# Patient Record
Sex: FEMALE | Race: WHITE | NOT HISPANIC OR LATINO | URBAN - METROPOLITAN AREA
[De-identification: names, ages, dates, MRNs, and addresses within clinical notes are randomized per-mention and may not be internally consistent; named-entity substitution may affect disease eponyms.]

---

## 2024-02-20 ENCOUNTER — OFFICE VISIT (OUTPATIENT)
Dept: UROLOGY | Facility: CLINIC | Age: 69
End: 2024-02-20
Payer: MEDICARE

## 2024-02-20 VITALS
WEIGHT: 136 LBS | BODY MASS INDEX: 25.03 KG/M2 | DIASTOLIC BLOOD PRESSURE: 73 MMHG | HEIGHT: 62 IN | HEART RATE: 64 BPM | SYSTOLIC BLOOD PRESSURE: 119 MMHG

## 2024-02-20 DIAGNOSIS — N20.0 NEPHROLITHIASIS: ICD-10-CM

## 2024-02-20 DIAGNOSIS — N39.0 RECURRENT UTI: Primary | ICD-10-CM

## 2024-02-20 PROCEDURE — 99999 PR PBB SHADOW E&M-NEW PATIENT-LVL IV: CPT | Mod: PBBFAC,,, | Performed by: UROLOGY

## 2024-02-20 PROCEDURE — 99204 OFFICE O/P NEW MOD 45 MIN: CPT | Mod: S$PBB,,, | Performed by: UROLOGY

## 2024-02-20 PROCEDURE — 99204 OFFICE O/P NEW MOD 45 MIN: CPT | Mod: PBBFAC | Performed by: UROLOGY

## 2024-02-20 RX ORDER — HYDROCHLOROTHIAZIDE 12.5 MG/1
12.5 TABLET ORAL
COMMUNITY
Start: 2024-02-08

## 2024-02-20 RX ORDER — MULTIVITAMIN
1 TABLET ORAL DAILY
COMMUNITY

## 2024-02-20 RX ORDER — TROSPIUM CHLORIDE 20 MG/1
20 TABLET, FILM COATED ORAL 2 TIMES DAILY
Qty: 60 TABLET | Refills: 11 | Status: SHIPPED | OUTPATIENT
Start: 2024-02-20 | End: 2024-02-20 | Stop reason: SDUPTHER

## 2024-02-20 RX ORDER — ESCITALOPRAM OXALATE 5 MG/1
5 TABLET ORAL
COMMUNITY
Start: 2024-01-29

## 2024-02-20 RX ORDER — OMEPRAZOLE 40 MG/1
40 CAPSULE, DELAYED RELEASE ORAL
COMMUNITY
Start: 2023-12-16

## 2024-02-20 RX ORDER — ESTRADIOL 10 UG/1
TABLET VAGINAL
COMMUNITY
Start: 2024-01-29

## 2024-02-20 RX ORDER — TROSPIUM CHLORIDE 20 MG/1
20 TABLET, FILM COATED ORAL 2 TIMES DAILY
Qty: 60 TABLET | Refills: 11 | Status: SHIPPED | OUTPATIENT
Start: 2024-02-20

## 2024-02-20 RX ORDER — ASPIRIN 81 MG/1
81 TABLET ORAL DAILY
COMMUNITY

## 2024-02-20 RX ORDER — ATORVASTATIN CALCIUM 40 MG/1
40 TABLET, FILM COATED ORAL
COMMUNITY
Start: 2024-02-19

## 2024-02-20 RX ORDER — CIPROFLOXACIN 500 MG/1
500 TABLET ORAL 2 TIMES DAILY
Qty: 14 TABLET | Refills: 0 | Status: SHIPPED | OUTPATIENT
Start: 2024-02-20 | End: 2024-02-27

## 2024-02-20 RX ORDER — NITROFURANTOIN MACROCRYSTALS 50 MG/1
50 CAPSULE ORAL DAILY PRN
Qty: 30 CAPSULE | Refills: 1 | Status: SHIPPED | OUTPATIENT
Start: 2024-02-20

## 2024-02-20 RX ORDER — VIBEGRON 75 MG/1
TABLET, FILM COATED ORAL DAILY
COMMUNITY

## 2024-02-20 NOTE — PROGRESS NOTES
"Urology - [facility]  Clinic Note    SUBJECTIVE:     Chief Complaint: recurrent uti    History of Present Illness:  Paty Saxena is a 68 y.o. female who presents to clinic for recurrent UTI. She is new to our clinic referred by I would like to thank So Saxena DO for referral of this patient. to our clinic. Referral from So Saxena DO   H/o recurrent UTI since 20s. She wants a second opinion. Gets UTI when she travels.   On vaginal estrogen, d-mannose  Was on prophylaxis. Most recent doctor stopped them.   She was on myrbetriq for urge incontinence, now on gemtesa. Never tried anticholinergic.   H/o back surgery 20 years ago,.    On HCTZ for prevention of kidney stones, had one 24 hour urine showed hypercalciuria.   Having some lower BP.   H/o kidney stones on imaging. Asymptomatic. 3mm. No recurrent stones.     1/11/2024 e.coli and enterococcus - pan sens. E.coli resist to amp    12/2022 klebsilella 10 - 100,000 rest to amp and macrobid    Ct urogram 2022 - left 3mm stone  Cysto 2022 negative      Anticoagulation:  No    OBJECTIVE:     Estimated body mass index is 24.88 kg/m² as calculated from the following:    Height as of this encounter: 5' 2" (1.575 m).    Weight as of this encounter: 61.7 kg (136 lb 0.4 oz).    Vital Signs (Most Recent)  Pulse: 64 (02/20/24 1035)  BP: 119/73 (02/20/24 1035)    Physical Exam  Vitals reviewed.   Pulmonary:      Effort: Pulmonary effort is normal.       No acute distress    Imaging:    No local records. All outside reports.       ASSESSMENT     1. Recurrent UTI    2. Nephrolithiasis      PLAN:   Diagnoses and all orders for this visit:    Recurrent UTI  -     Urine culture    Nephrolithiasis  -     US Kidney; Future  -     X-Ray Abdomen AP 1 View; Future    Other orders  -     Cancel: Urine culture  -     Cancel: Cytology, Urine  -     Cancel: CT Urogram Abd Pelvis W WO; Future  -     Cancel: Cystoscopy; Future  -     Cancel: Creatinine, serum; " Future  -     ciprofloxacin HCl (CIPRO) 500 MG tablet; Take 1 tablet (500 mg total) by mouth 2 (two) times daily. for 7 days  -     Discontinue: trospium (SANCTURA) 20 mg Tab tablet; Take 1 tablet (20 mg total) by mouth 2 (two) times daily.  -     trospium (SANCTURA) 20 mg Tab tablet; Take 1 tablet (20 mg total) by mouth 2 (two) times daily.  -     nitrofurantoin (MACRODANTIN) 50 MG capsule; Take 1 capsule (50 mg total) by mouth daily as needed. Prn sexual activity    D- mannose 1 gram twice a day (was only on 400 mg bid)  Continue vaginal estrogen.   Stop hctz - its a diurectic and likely contributing to her oab. Also only one stone 3mm.   Trial of trospium 20mg po bid - stop gemtesa.   Cipro for trip based on previous sensitivities.   Post coital prophylaxis.   F/u 3 months.       Hazel Vaughn MD     Letter to So Saxena DO

## 2024-02-20 NOTE — PATIENT INSTRUCTIONS
D mannose 2 grams a day - whole, foods, amaz[employer], [employer]  Continue vaginal estrogen.   More fluids  Stop hctz  Stop gemtesa  Post coital prophylaxis    Trial of trospium 20mg twice a day

## 2024-06-25 ENCOUNTER — OFFICE VISIT (OUTPATIENT)
Dept: UROLOGY | Facility: CLINIC | Age: 69
End: 2024-06-25
Payer: MEDICARE

## 2024-06-25 VITALS
DIASTOLIC BLOOD PRESSURE: 71 MMHG | BODY MASS INDEX: 24.56 KG/M2 | HEART RATE: 68 BPM | SYSTOLIC BLOOD PRESSURE: 134 MMHG | WEIGHT: 134.25 LBS

## 2024-06-25 DIAGNOSIS — N32.81 OVERACTIVE BLADDER: Primary | ICD-10-CM

## 2024-06-25 DIAGNOSIS — N39.0 RECURRENT UTI: ICD-10-CM

## 2024-06-25 LAB
BACTERIA #/AREA URNS AUTO: ABNORMAL /HPF
MICROSCOPIC COMMENT: ABNORMAL
SQUAMOUS #/AREA URNS AUTO: 1 /HPF
WBC #/AREA URNS AUTO: 1 /HPF (ref 0–5)

## 2024-06-25 PROCEDURE — 99999 PR PBB SHADOW E&M-EST. PATIENT-LVL III: CPT | Mod: PBBFAC,,, | Performed by: UROLOGY

## 2024-06-25 PROCEDURE — 81001 URINALYSIS AUTO W/SCOPE: CPT | Performed by: UROLOGY

## 2024-06-25 PROCEDURE — 87086 URINE CULTURE/COLONY COUNT: CPT | Performed by: UROLOGY

## 2024-06-25 PROCEDURE — 87077 CULTURE AEROBIC IDENTIFY: CPT | Performed by: UROLOGY

## 2024-06-25 PROCEDURE — 87186 SC STD MICRODIL/AGAR DIL: CPT | Performed by: UROLOGY

## 2024-06-25 PROCEDURE — 99214 OFFICE O/P EST MOD 30 MIN: CPT | Mod: S$PBB,,, | Performed by: UROLOGY

## 2024-06-25 PROCEDURE — 87088 URINE BACTERIA CULTURE: CPT | Performed by: UROLOGY

## 2024-06-25 PROCEDURE — 99213 OFFICE O/P EST LOW 20 MIN: CPT | Mod: PBBFAC | Performed by: UROLOGY

## 2024-06-25 RX ORDER — DIAZEPAM 5 MG/1
TABLET ORAL
Qty: 1 TABLET | Refills: 0 | Status: SHIPPED | OUTPATIENT
Start: 2024-06-25

## 2024-06-25 NOTE — PROGRESS NOTES
"Urology - [facility]  Clinic Note    SUBJECTIVE:     Chief Complaint: recurrent uti    History of Present Illness:  Paty Saxena is a 69 y.o. female who presents to clinic for recurrent UTI. She is new to our clinic referred by I would like to thank No ref. provider found for referral of this patient. to our clinic. Referral from No ref. provider found   H/o recurrent UTI since 20s. She wants a second opinion. Gets UTI when she travels.     On HCTZ for prevention of kidney stones, had one 24 hour urine showed hypercalciuria.   H/o kidney stones on imaging. Asymptomatic. 3mm. No recurrent stones.     1/11/2024 e.coli and enterococcus - pan sens. E.coli resist to amp    12/2022 klebsilella 10 - 100,000 rest to amp and macrobid    Ct urogram 2022 - left 3mm stone  Cysto 2022 negative    Reports foul smelling urine today- this bothers her.  No dysuria, frequency, urgency, hematuria.  On D-mannose and vaginal estrogen.  She was on myrbetriq for urge incontinence, now on trospium.   H/o back surgery 20 years ago.  No flank pain.  Reports a possible UTI in March 2024 before a trip.  No urine culture.  Treated by her son with abx.  Does not feel like she has kidney stones now but doesn't remember passing any.  Paranoid about bladder infections in the past.        Anticoagulation:  No    OBJECTIVE:     Estimated body mass index is 24.56 kg/m² as calculated from the following:    Height as of 2/20/24: 5' 2" (1.575 m).    Weight as of this encounter: 60.9 kg (134 lb 4.2 oz).    Vital Signs (Most Recent)  Pulse: 68 (06/25/24 0910)  BP: 134/71 (06/25/24 0910)    Physical Exam  Vitals reviewed.   Pulmonary:      Effort: Pulmonary effort is normal.       No acute distress    Urine dip tr leuks, tr blood     Imaging:    No local records. All outside reports.       ASSESSMENT     1. Overactive bladder    2. Recurrent UTI        PLAN:   Paty was seen today for follow-up.    Diagnoses and all orders for this " visit:    Overactive bladder    Recurrent UTI        D- mannose 1 gram twice a day (was only on 400 mg bid)  Continue vaginal estrogen.   Continue trospium 20mg po bid     Never completed recurrent UTI workup.  Will plan to schedule cysto, renal US today.  Also KUB because of stone history.    Urine sent for culture and micro         Niki Malone MD     Letter to No ref. provider found

## 2024-06-27 ENCOUNTER — HOSPITAL ENCOUNTER (OUTPATIENT)
Dept: RADIOLOGY | Facility: HOSPITAL | Age: 69
Discharge: HOME | End: 2024-06-27
Attending: UROLOGY
Payer: MEDICARE

## 2024-06-27 DIAGNOSIS — N20.0 NEPHROLITHIASIS: ICD-10-CM

## 2024-06-27 LAB — BACTERIA UR CULT: ABNORMAL

## 2024-06-27 PROCEDURE — 76770 US EXAM ABDO BACK WALL COMP: CPT | Mod: TC

## 2024-06-27 PROCEDURE — 74018 RADEX ABDOMEN 1 VIEW: CPT | Mod: 26,,, | Performed by: RADIOLOGY

## 2024-06-27 PROCEDURE — 74018 RADEX ABDOMEN 1 VIEW: CPT | Mod: TC

## 2024-06-27 PROCEDURE — 76770 US EXAM ABDO BACK WALL COMP: CPT | Mod: 26,,, | Performed by: RADIOLOGY

## 2024-07-03 ENCOUNTER — PROCEDURE VISIT (OUTPATIENT)
Dept: UROLOGY | Facility: CLINIC | Age: 69
End: 2024-07-03
Payer: MEDICARE

## 2024-07-03 VITALS
WEIGHT: 125 LBS | SYSTOLIC BLOOD PRESSURE: 143 MMHG | BODY MASS INDEX: 22.86 KG/M2 | DIASTOLIC BLOOD PRESSURE: 67 MMHG | TEMPERATURE: 98 F | HEART RATE: 62 BPM | RESPIRATION RATE: 20 BRPM

## 2024-07-03 DIAGNOSIS — N32.81 OVERACTIVE BLADDER: ICD-10-CM

## 2024-07-03 DIAGNOSIS — N39.0 RECURRENT UTI: ICD-10-CM

## 2024-07-03 PROCEDURE — 96372 THER/PROPH/DIAG INJ SC/IM: CPT | Mod: PBBFAC

## 2024-07-03 PROCEDURE — 52000 CYSTOURETHROSCOPY: CPT | Mod: PBBFAC | Performed by: UROLOGY

## 2024-07-03 PROCEDURE — 99999PBSHW PR PBB SHADOW TECHNICAL ONLY FILED TO HB: Mod: PBBFAC,,,

## 2024-07-03 RX ORDER — GENTAMICIN 40 MG/ML
80 INJECTION, SOLUTION INTRAMUSCULAR; INTRAVENOUS
Status: COMPLETED | OUTPATIENT
Start: 2024-07-03 | End: 2024-07-03

## 2024-07-03 RX ORDER — CIPROFLOXACIN 500 MG/1
500 TABLET ORAL 2 TIMES DAILY
Qty: 10 TABLET | Refills: 0 | Status: SHIPPED | OUTPATIENT
Start: 2024-07-03 | End: 2024-07-08

## 2024-07-03 RX ORDER — GENTAMICIN 40 MG/ML
80 INJECTION, SOLUTION INTRAMUSCULAR; INTRAVENOUS
Status: SHIPPED | OUTPATIENT
Start: 2024-07-03

## 2024-07-03 RX ORDER — LIDOCAINE HYDROCHLORIDE 20 MG/ML
JELLY TOPICAL
Status: COMPLETED | OUTPATIENT
Start: 2024-07-03 | End: 2024-07-03

## 2024-07-03 RX ADMIN — GENTAMICIN SULFATE 80 MG: 40 INJECTION, SOLUTION INTRAMUSCULAR; INTRAVENOUS at 08:07

## 2024-07-03 RX ADMIN — LIDOCAINE HYDROCHLORIDE: 20 JELLY TOPICAL at 08:07

## 2024-07-03 NOTE — PATIENT INSTRUCTIONS
What to Expect After a Cystoscopy  For the next 24-48 hours, you may feel a mild burning when you urinate. This burning is normal and expected. Drink plenty of water to dilute the urine to help relieve the burning sensation. You may also see a small amount of blood in your urine after the procedure.    Unless you are already taking antibiotics, you may be given an antibiotic after the test to prevent infection.    Signs and Symptoms to Report  Call the [facility] at [phone] if you develop any of the following:  Fever of 101 degrees or higher  Chills or persistent bleeding  Inability to urinate

## 2024-07-03 NOTE — PROCEDURES
Cystoscopy    Date/Time: 7/3/2024 8:15 AM    Performed by: Hazel Vaughn MD  Authorized by: Hazel Vaughn MD    Consent Done?:  Yes (Written)  Timeout: prior to procedure the correct patient, procedure, and site was verified    Prep: patient was prepped and draped in usual sterile fashion    Local anesthesia used?: Yes    Local anesthetic:  Topical anesthetic  Preparation: Patient was prepped and draped in usual sterile fashion    Scope type:  Flexible cystoscope  External exam normal: Yes    Urethra normal: Yes    Bladder neck normal: Yes    Bladder normal: Yes     patient tolerated the procedure well with no immediate complications  Comments:      Evidence of cystitis cystica.   Not treated on last culture. Has script for cipro already. She will take it. Gent 80mg IM given today. She is really asymptomatic. Urine was clear.   Continue vaginal estrogen, d-mannose, probiotics. Post coital prophylaxis.   F/u 3 months.   Has a 1.5mm stone on ultrasound. Likely not real. Plan for kub in 1 year.   F/u 3 months.

## 2024-07-05 DIAGNOSIS — N20.0 NEPHROLITHIASIS: Primary | ICD-10-CM

## 2024-07-26 ENCOUNTER — CLINICAL SUPPORT (OUTPATIENT)
Dept: REHABILITATION | Facility: HOSPITAL | Age: 69
End: 2024-07-26
Attending: UROLOGY
Payer: MEDICARE

## 2024-07-26 DIAGNOSIS — R27.8 COORDINATION ABNORMAL: ICD-10-CM

## 2024-07-26 DIAGNOSIS — N39.0 RECURRENT UTI: ICD-10-CM

## 2024-07-26 DIAGNOSIS — N32.81 OVERACTIVE BLADDER: ICD-10-CM

## 2024-07-26 DIAGNOSIS — M62.838 MUSCLE SPASM: Primary | ICD-10-CM

## 2024-07-26 PROCEDURE — 97161 PT EVAL LOW COMPLEX 20 MIN: CPT | Mod: PN | Performed by: PHYSICAL THERAPIST

## 2024-07-26 PROCEDURE — 97530 THERAPEUTIC ACTIVITIES: CPT | Mod: PN | Performed by: PHYSICAL THERAPIST

## 2024-07-26 NOTE — PATIENT INSTRUCTIONS
You need about 67oz of water a day = 4 bottles   - start slow and progress as you can  - more during the day than at night  - coffee, tea, lemonade, alcohol = bladder irritants = leakage and urgency    - decrease bladder irritants - do not have to eliminate    Chair Yoga    DIAPHRAGMATIC BREATHING     The diaphragm is a dome shaped muscle that forms the floor of the rib cage. It is the most efficient muscle for breathing and relaxation, although most people are not used to using the diaphragm. Diaphragmatic or belly breathing is an important technique to learn because it helps settle down or relax the autonomic nervous system. The correct use of diaphragmatic breathing can help to quiet brain activity resulting in the relaxation of all the muscles and organs of the body. This is accomplished by slow rhythmic breathing concentrated in the diaphragm muscle rather than the chest.    How to do proper relaxation breathing:    Start by lying on your back or reclining in a chair in a relaxed position. Place one hand on your chest and the other on your abdomen.  Relax your jaw by placing your tongue on the floor of your mouth and keeping your teeth slightly apart.   Take a deep breath in, letting the abdomen expand and rise while you keep your upper chest, neck and shoulders relaxed.   As you breathe out, allow your abdomen and chest to fall. Exhale completely.  It doesn't matter if you breathe in/out through your nose and/or mouth. Do whichever feels comfortable.  Remember to breathe slowly.  Do not force your breathing. Do not hold your breath.  Repeat for 5 minutes every day.

## 2024-07-26 NOTE — PLAN OF CARE
[facility]   Physical Therapy Initial Evaluation     Date: 7/26/2024   Name: Paty Saxena  Clinic Number: [id number]    Therapy Diagnosis:   Encounter Diagnoses   Name Primary?    Overactive bladder     Recurrent UTI     Muscle spasm Yes    Coordination abnormal      Physician: Hazel Vaughn,*    Physician Orders: PT Eval and Treat PF PT  Medical Diagnosis from Referral: N32.81 (ICD-10-CM) - Overactive bladder N39.0 (ICD-10-CM) - Recurrent UTI  Evaluation Date: 7/26/2024  Authorization Period Expiration: 6/25/2025  Plan of Care Expiration: 10/18/2024  Progress Note Due: 8/23/2024  Visit # 1/12 Visits authorized: 1/ 1   FOTO: 1/3    Precautions: Standard     Time In: 9:07 AM   Time Out: 9:47 AM   Total Appointment Time (timed & untimed codes): 40 minutes    HISTORY      Paty is a 69 y.o. female evaluated on 07/26/2024    Physician:  Hazel Vaughn,*   Diagnosis:   Encounter Diagnoses   Name Primary?    Overactive bladder     Recurrent UTI     Muscle spasm Yes    Coordination abnormal       Treatment ordered: Physical Therapy  Medical History:   Past Medical History:   Diagnosis Date    Kidney stone     Urinary tract infection     Anxiety        Surgical History: low back surgery over 25 years ago, cystoscope done 2 weeks ago and 1.5 years ago     Medications:   Current Outpatient Medications   Medication Sig    aspirin (ECOTRIN) 81 MG EC tablet Take 81 mg by mouth once daily.    atorvastatin (LIPITOR) 40 MG tablet Take 40 mg by mouth - preventative     EScitalopram oxalate (LEXAPRO) 5 MG Tab Take 5 mg by mouth.    Lactobacillus rhamnosus GG (CULTURELLE) 10 billion cell capsule Take 1 capsule by mouth once daily.    nitrofurantoin (MACRODANTIN) 50 MG capsule Take 1 capsule (50 mg total) by mouth daily as needed. Prn sexual activity    omeprazole (PRILOSEC) 40 MG capsule Take 40 mg by mouth.    Semiglutide     trospium (SANCTURA) 20 mg Tab tablet Take 1 tablet (20 mg  "total) by mouth 2 (two) times daily - helps with leakage    UNABLE TO FIND D MANNOSE    Estradiol Insert Vaginal Twice a Week     Current Facility-Administered Medications   Medication    gentamicin injection 80 mg       Allergies:   Review of patient's allergies indicates:  No Known Allergies     Precautions: universal    OB/GYN History:  childbirth vaginal delivery x3 with tearing/episiotomy     Bladder/Bowel History: trouble initiating urine stream, kidney stones, slow stream, recurrent bladder infections, urinary incontinence, and constipation/straining for movement      SUBJECTIVE     Date of onset: about 15 years ago     History of current complaint: Has seeing Dr. Arriola in [place] for urology, now seeing Dr. Vaughn. She has lost 40lbs in about 15 months with Semiglutide - had a little constipation. Treated it with magnesium. Has been on vaginal estrogen for 5 years. Patient reports that if she waits too long to go to the bathroom she will leak. "When I have to go I have to go". Reports that she will leak urine prior to getting to the toilet. Denies leakage with cough/sneeze.     She's Dr. So Saxena for gyn. Tries to do Kegels.     Patient's goals for therapy: Prevent UTIs, "I do not want to wear pads"     Pain: Patient reports 0/10, with 0 being the lowest and 10 being the highest.    Activities that cause symptoms: strong urge to go, walking to the toilet, full bladder, and sexual activity    Previous treatment included medical treatment    Sexually active? Yes - having superficial pain with initial penetration, using lubricant, using vaginal estrogen, no pain with gyn exam     Frequency of urination:   Daytime: 4 times a day           Nighttime: 2-3 times - drinks water at night     Difficulty initiating urine stream: Yes - when you have a UTI   Urine stream: strong  Complete emptying: Yes  Bladder leakage: Yes - urgency   Frequency of incidents: 0-1  Amount leaked (urine): teaspoons - makes it " to commode changes underwear and pants    Frequency of bowel movements: once a day  Difficulty initiating BM: No - was straining before Magnesium  Quality/Shape of BM: [place] Stool Chart 4  Colon leakage: No  Frequency of incidents: none    Amount leaked (bowels): none  Form of protection: none  Number of pads required in 24 hours: changes clothes when she is wet - varies 0-1    Types of fluid intake: water - 2 bottles, bladder irritants   Diet: balanced diet  Current exercise: would like to start exercising    Occupation: not assessed    OBJECTIVE     ORTHO SCREEN  Posture: WNL, guarded   Pelvic alignment: not assessed    ABDOMINALS - not assessed    VAGINAL PELVIC FLOOR EXAM - to be performed next visit    TREATMENT    Paty participated in dynamic functional therapeutic activities to improve functional performance for 10  minutes, including:  Encouraged yoga and deep breathing to assist with PF relaxation.      Education: instructed on general anatomy/physiology of urinary/bowel system; discussed plan of care with patient and parent/guardian; instructed in benefits/risks of treatment; instructed in alternative methods of treatment; instructed in risks of refusing treatment; patient a parent agreed to treatment plan.     Also educated in: anatomy/physiology of pelvic floor, posture/body mechanices, and behavior modifications    ASSESSMENT      This is a 69 y.o. female referred to outpatient physical therapy and presents with a medical diagnosis of N32.81 (ICD-10-CM) - Overactive bladder N39.0 (ICD-10-CM) - Recurrent UTI. Patient will benefit from skilled physical therapy to improve bladder habits and QoL.    Educational/Spiritual/Cultural needs: none  Abuse/Neglect: no signs  Nutritional Status: WDWN   Fall Risk: patient is not a fall risk    Pt's spiritual, cultural and educational needs considered and pt agreeable to plan of care and goals as stated below:     Medical Necessity is demonstrated by the  following:  History  Co-morbidities and personal factors that may impact the plan of care [x] LOW: no personal factors / co-morbidities  [] MODERATE: 1-2 personal factors / co-morbidities  [] HIGH: 3+ personal factors / co-morbidities    Moderate / High Support Documentation:   Co-morbidities affecting plan of care: none    Personal Factors:   no deficits     Examination  Body Structures and Functions, activity limitations and participation restrictions that may impact the plan of care [x] LOW: addressing 1-2 elements  [] MODERATE: 3+ elements  [] HIGH: 4+ elements (please support below)    Moderate / High Support Documentation:      Clinical Presentation [x] LOW: stable  [] MODERATE: Evolving  [] HIGH: Unstable     Decision Making/ Complexity Score: low           PLAN    Frequency: 1x per week  Duration: 12 weeks    Short Term Goals: 6 weeks   Patient will be independent with pelvic floor down training/awareness.  Patient will be independent with pelvic floor relaxation to improve bowel and bladder evacuation without straining.  Patient will be able to demonstrate improved pelvic floor relaxation and contraction on rehabilitative ultrasound vs. sEMG.  Patient will report regular meditation, diaphragmatic breathing, pelvic floor down training.   Patient will report being dry 5/7 days a week.   Patient will be independent with good water intake and decreased bladder irritants.    Long Term Goals: 12 weeks   Patient will report urinating every 3-4 hours with strong urine stream.   Patient will report full return to regular exercise.  Patient will report improved quality of life and tolerance for activities outside of her home due to improved bladder habits.  Patient will demonstrate adequate pelvic floor coordination with contraction and relaxation on sEMG vs rehabilitative ultrasound.    Patient will be independent with proper core and pelvic floor coordination with progressive strength training.      Physical  therapy will include: therapeutic exercise, manual therapy, therapeutic activities, neuromuscular re-education, manual therapy, modalities PRN, gait training, and self-care education.     Therapist: Sha Good, PT  Board-certified Women's Health Clinical Specialist  7/26/2024

## 2024-09-09 ENCOUNTER — CLINICAL SUPPORT (OUTPATIENT)
Dept: REHABILITATION | Facility: HOSPITAL | Age: 69
End: 2024-09-09
Payer: MEDICARE

## 2024-09-09 DIAGNOSIS — N39.0 RECURRENT UTI: ICD-10-CM

## 2024-09-09 DIAGNOSIS — N32.81 OVERACTIVE BLADDER: Primary | ICD-10-CM

## 2024-09-09 DIAGNOSIS — M62.838 MUSCLE SPASM: ICD-10-CM

## 2024-09-09 DIAGNOSIS — R27.8 COORDINATION ABNORMAL: ICD-10-CM

## 2024-09-09 PROCEDURE — 97112 NEUROMUSCULAR REEDUCATION: CPT | Mod: PN | Performed by: PHYSICAL THERAPIST

## 2024-09-09 PROCEDURE — 97140 MANUAL THERAPY 1/> REGIONS: CPT | Mod: PN | Performed by: PHYSICAL THERAPIST

## 2024-09-09 PROCEDURE — 97530 THERAPEUTIC ACTIVITIES: CPT | Mod: PN | Performed by: PHYSICAL THERAPIST

## 2024-09-09 NOTE — PATIENT INSTRUCTIONS
"DO not spoil the bladder by urinating "just in case"  - have to consider the situation  - slowly (about 15 min at a time) will will increase the bladder capacity   - start with 2 hours and 15 min   - suppress the urge by using a Kegel to send a message to the bladder that says "relax and store"   - a tight muscle does not work well = do not just do KEGELS!   - give yourself some jose r with bladder irritants - concentrated urine wants to get out of the bladder     Good water intake!     Pelvic floor relaxation: gentle feeling of down and out   - occurs when urination, bowel movement, vaginal penetration, or passing gas  - every hour - tune into your pelvic floor and relax - I do not want the feeling of a clench all day!   - do not hold tension in the hips  - with breathing - let the diaphragm descend and the pelvic floor descend   - Maegan belly without a forceful push    DIAPHRAGMATIC BREATHING     The diaphragm is a dome shaped muscle that forms the floor of the rib cage. It is the most efficient muscle for breathing and relaxation, although most people are not used to using the diaphragm. Diaphragmatic or belly breathing is an important technique to learn because it helps settle down or relax the autonomic nervous system. The correct use of diaphragmatic breathing can help to quiet brain activity resulting in the relaxation of all the muscles and organs of the body. This is accomplished by slow rhythmic breathing concentrated in the diaphragm muscle rather than the chest.    How to do proper relaxation breathing:    Start by lying on your back or reclining in a chair in a relaxed position. Place one hand on your chest and the other on your abdomen.  Relax your jaw by placing your tongue on the floor of your mouth and keeping your teeth slightly apart.   Take a deep breath in, letting the abdomen expand and rise while you keep your upper chest, neck and shoulders relaxed.   As you breathe out, allow your abdomen and " chest to fall. Exhale completely.  It doesn't matter if you breathe in/out through your nose and/or mouth. Do whichever feels comfortable.  Remember to breathe slowly.  Do not force your breathing. Do not hold your breath.  Repeat for 5 minutes every day.                  CONTROLLING URINARY / FECAL URGENCY      WHEN YOU EXPERIENCE A STRONG URGE TO URINATE OR DEFECATE:    FIRST Stop activity, stand quietly or sit down. Try to stay very still to maintain control. Avoid rushing to the toilet.    SECOND Begin Quick Flicks (1 second LIFT of pelvic floor muscles, 4 second DROP). Pelvic floor contractions send a message to the bladder to relax and hold urine.     THIRD Relax. Do not rush to the toilet. Take a deep belly or diaphragmatic breath and let it out slowly. Let the urge to urinate pass by using distraction techniques and positive thoughts. Try not to think about going to the bathroom.    FINALLY If the urge returns, repeat the above steps to regain control. When you feel the urge subside, walk normally to the bathroom. You can urinate once the urge has subsided.          Urge feeling!      Stop and be still. Do NOT bryant to   Think positively.         Begin Quick Flicks.      the toilet.  Distract yourself.

## 2024-09-09 NOTE — PROGRESS NOTES
Pelvic Health Physical Therapy   Treatment Note and Progress Note     Name: Paty Saxena  Clinic Number: [id number]    Therapy Diagnosis:   Encounter Diagnoses   Name Primary?    Overactive bladder Yes    Recurrent UTI     Coordination abnormal     Muscle spasm      Physician: Hazel Vaughn,*    Visit Date: 9/9/2024    Physician Orders: PT Eval and Treat PF PT  Medical Diagnosis from Referral: N32.81 (ICD-10-CM) - Overactive bladder N39.0 (ICD-10-CM) - Recurrent UTI  Evaluation Date: 7/26/2024  Authorization Period Expiration: 6/25/2025  Plan of Care Expiration: 10/18/2024  Progress Note Due: 8/23/2024  Visit # 2/12 Visits authorized: 1/12  FOTO: 2/3    Cancelled Visits: 0  No Show Visits: 0    Time In: 7:15 AM   Time Out: 8:19 AM   Total Billable Time: 64 minutes    Precautions: Standard    Subjective     Pt reports: Has not been doing her breathing. Needs better compliance.     She was compliant with home exercise program.  Response to previous treatment: good understanding   Functional change: none    Pain in:  0/10  Pain out: 0/10  Location: none       Objective     Paty participated in neuromuscular re-education activities to develop Coordination, Down training, and Proprioception for 30 minutes including: rehabilitative ultrasound imaging to help visualize pelvic floor muscle contraction and relaxation with kegels  Very little pelvic floor movement with attempts at relaxation.     Paty participated in dynamic functional therapeutic activities to improve functional performance for 24 minutes, including:  Educated on urge suppression with use of Kegel, educated on breathing mechanics with elongation of the pelvic floor.     Paty received the following manual therapy techniques: vaginal exam for 10 minutes including:   VAGINAL PELVIC FLOOR EXAM    EXTERNAL ASSESSMENT  Introitus: WNL  Skin condition: WNL  Scarring: significant episiotomy scar noted to the inner left thigh  Sensation: WNL    Pain:  none  Voluntary contraction: visible lift  Voluntary relaxation: nil  Perineal descent: absent      INTERNAL ASSESSMENT  Pain: none   Sensation: WNL  Vaginal vault: WNL   Muscle Bulk: hypertonus mild  Muscle Power: 3/5    Quality of contraction: incomplete relaxation   Specificity: WNL    Prolapse check: anterior vaginal wall mobility with attempts at relaxation            Intervention Eval  09/09/2024    Neuro Re-Ed Rehabilitative ultrasound    rehabilitative ultrasound imaging to help visualize pelvic floor muscle contraction and relaxation with kegels  Very little pelvic floor movement with attempts at relaxation.      Manual Ther Vaginal exam   Increased mobility of the anterior vaginal wall with increased intra-abdominal pressure     TherAct Education    Urge suppression with use of Kegel        Home Exercises Provided and Patient Education Provided     Education provided:   - anatomy/physiology of pelvic floor, diaphragmatic breathing, fluid intake/dietary modifications, and behavior modifications  Discussed progression of plan of care with patient; educated pt in activity modification; reviewed HEP with pt. Pt demonstrated and verbalized understanding of all instruction and was provided with a handout of HEP (see Patient Instructions).    Written Home Exercises Provided: yes.  Exercises were reviewed and Paty was able to demonstrate them prior to the end of the session.  Paty demonstrated good  understanding of the education provided.     See EMR under Patient Instructions for exercises provided 9/9/2024.    Assessment     Patient tolerated treatment well. Good understanding of physical therapist recommendations.     Paty Is progressing well towards her goals.   Pt prognosis is Excellent.     Pt will continue to benefit from skilled outpatient physical therapy to address the deficits listed in the problem list box on initial evaluation, provide pt/family education and to maximize pt's  level of independence in the home and community environment.     Pt's spiritual, cultural and educational needs considered and pt agreeable to plan of care and goals.     Anticipated barriers to physical therapy: none    Short Term Goals: 6 weeks   Patient will be independent with pelvic floor down training/awareness.  Patient will be independent with pelvic floor relaxation to improve bowel and bladder evacuation without straining.  Patient will be able to demonstrate improved pelvic floor relaxation and contraction on rehabilitative ultrasound vs. sEMG.  Patient will report regular meditation, diaphragmatic breathing, pelvic floor down training.   Patient will report being dry 5/7 days a week.   Patient will be independent with good water intake and decreased bladder irritants.     Long Term Goals: 12 weeks   Patient will report urinating every 3-4 hours with strong urine stream.   Patient will report full return to regular exercise.  Patient will report improved quality of life and tolerance for activities outside of her home due to improved bladder habits.  Patient will demonstrate adequate pelvic floor coordination with contraction and relaxation on sEMG vs rehabilitative ultrasound.    Patient will be independent with proper core and pelvic floor coordination with progressive strength training.      Plan     Continue with progressive PF mobility and awareness training.     Sha Good, PT

## 2024-09-16 ENCOUNTER — CLINICAL SUPPORT (OUTPATIENT)
Dept: REHABILITATION | Facility: HOSPITAL | Age: 69
End: 2024-09-16
Payer: MEDICARE

## 2024-09-16 DIAGNOSIS — N39.0 RECURRENT UTI: ICD-10-CM

## 2024-09-16 DIAGNOSIS — R27.8 COORDINATION ABNORMAL: ICD-10-CM

## 2024-09-16 DIAGNOSIS — N32.81 OVERACTIVE BLADDER: Primary | ICD-10-CM

## 2024-09-16 DIAGNOSIS — M62.838 MUSCLE SPASM: ICD-10-CM

## 2024-09-16 PROCEDURE — 97112 NEUROMUSCULAR REEDUCATION: CPT | Mod: PN | Performed by: PHYSICAL THERAPIST

## 2024-09-16 PROCEDURE — 97530 THERAPEUTIC ACTIVITIES: CPT | Mod: PN | Performed by: PHYSICAL THERAPIST
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
## 2024-09-16 NOTE — PATIENT INSTRUCTIONS
Hector to decrease urge, but if the pelvic floor is working over time - it will not work very well   - clenching the pelvic floor all day is not the answer    Put the air in your belly and pelvis not your chest - when doing breathing   - think of a natural rhythm     I do not want a feeling of tension in pelvis all day.     Just be aware of your pelvic floor - be aware of your body   - if you feel tension let it go!     A tight muscle does not work well.

## 2024-09-23 ENCOUNTER — CLINICAL SUPPORT (OUTPATIENT)
Dept: REHABILITATION | Facility: HOSPITAL | Age: 69
End: 2024-09-23
Payer: MEDICARE

## 2024-09-23 DIAGNOSIS — M62.838 MUSCLE SPASM: ICD-10-CM

## 2024-09-23 DIAGNOSIS — N39.0 RECURRENT UTI: ICD-10-CM

## 2024-09-23 DIAGNOSIS — N32.81 OVERACTIVE BLADDER: Primary | ICD-10-CM

## 2024-09-23 DIAGNOSIS — R27.8 COORDINATION ABNORMAL: ICD-10-CM

## 2024-09-23 PROCEDURE — 97112 NEUROMUSCULAR REEDUCATION: CPT | Mod: PN | Performed by: PHYSICAL THERAPIST

## 2024-09-23 PROCEDURE — 97530 THERAPEUTIC ACTIVITIES: CPT | Mod: PN | Performed by: PHYSICAL THERAPIST
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
## 2024-09-23 NOTE — PATIENT INSTRUCTIONS
"Abdominal strength: gentle belly button to your back   - inhale - open the lower ribs  - exhale - draw the lower ribs inward  - do not hold your breath - no one should know what you are doing   - 10xs, 10 sec hold   - Pretend we are putting shrink wrap around the intestines  - Pretend you zipping a tight pair of pants     Start easy - laying down, sitting or standing   Soon enough - use this stability with function.    Close your box - core and pelvic floor are best friends - they want to work together     Prevent letting the pelvic floor work 24/7 - use it when we needed it and let the muscle synergy work together (core and pelvic floor)     Pay attention to what the week brings    Gym:   - Chair Yoga   - Balance between mobility and stability     Strength training: if you can "keep your box shut" while lifting I am "ok" with it   - lower weight, higher reps if you are struggling     General recommendation:  - Cardio - 150 min a week (30 min a day 5 days a week)   - Strength training - 2 days a week   "

## 2024-09-30 PROBLEM — N39.0 RECURRENT UTI: Status: RESOLVED | Noted: 2024-06-25 | Resolved: 2024-09-30

## 2024-10-21 ENCOUNTER — CLINICAL SUPPORT (OUTPATIENT)
Dept: REHABILITATION | Facility: HOSPITAL | Age: 69
End: 2024-10-21
Payer: MEDICARE

## 2024-10-21 DIAGNOSIS — M62.838 MUSCLE SPASM: ICD-10-CM

## 2024-10-21 DIAGNOSIS — N39.0 RECURRENT UTI: ICD-10-CM

## 2024-10-21 DIAGNOSIS — R27.8 COORDINATION ABNORMAL: ICD-10-CM

## 2024-10-21 DIAGNOSIS — N32.81 OVERACTIVE BLADDER: Primary | ICD-10-CM

## 2024-10-21 PROCEDURE — 97112 NEUROMUSCULAR REEDUCATION: CPT | Mod: KX,PN | Performed by: PHYSICAL THERAPIST

## 2024-10-21 PROCEDURE — 97530 THERAPEUTIC ACTIVITIES: CPT | Mod: KX,PN | Performed by: PHYSICAL THERAPIST

## 2024-10-21 NOTE — PLAN OF CARE
"  Pelvic Health Physical Therapy   Treatment Note and Updated POC     Name: Paty Saxena  Clinic Number: [id number]    Therapy Diagnosis:   Encounter Diagnoses   Name Primary?    Overactive bladder Yes    Recurrent UTI     Coordination abnormal     Muscle spasm      Physician: Hazel Vaughn,*    Visit Date: 10/21/2024    Physician Orders: PT Eval and Treat PF PT  Medical Diagnosis from Referral: N32.81 (ICD-10-CM) - Overactive bladder N39.0 (ICD-10-CM) - Recurrent UTI  Evaluation Date: 2024  Authorization Period Expiration: 2025  Plan of Care Expiration: 2024  Progress Note Due: 2024  Visit # 5/ Visits authorized:   FOTO: 3/3    Cancelled Visits: 0  No Show Visits: 0    Time In: 7: 30 AM   Time Out: 8:39 AM     Total Billable Time: 69 minutes    Precautions: Standard    Subjective     Pt reports: Has had a busy 2 weeks. "I think my bladder is doing fine". Gets nervous in public and won't drink due to not wanting to use a port-o-let. Doing well at home. Has some anxiety about traveling. She is picky about what bathrooms she uses. Using her Revaree suppositories - no longer burning. Bowels are moving well - doing Magnesium Citrate. On Trospium. Seeing Dr. Vaughn in a few weeks. Would like to start [place]. Holding the urge better.     She was compliant with home exercise program.  Response to previous treatment: good understanding   Functional change: none    Pain in:  0/10  Pain out: 0/10  Location: none     Objective     Paty participated in neuromuscular re-education activities to develop Coordination, Down training, and Proprioception for 30 minutes includinmL, PVR 10mL  PF coordination training with perineal rehabilitative ultrasound, PF and core muscle synergy present    Paty participated in dynamic functional therapeutic activities to improve functional performance for 38 minutes, including: Encouraged urge suppression at night, provided education. " Encouraged better bladder habits in public and to use protection if needed.        Intervention Eval  09/09/2024 09/16/2024  09/23/2024  10/21/2024    Neuro Re-Ed Rehabilitative ultrasound    rehabilitative ultrasound imaging to help visualize pelvic floor muscle contraction and relaxation with kegels  Very little pelvic floor movement with attempts at relaxation.   rehabilitative ultrasound imaging to help visualize pelvic floor muscle contraction and relaxation with kegels  Improved pelvic floor movement with attempts at relaxation. Max cues for mechanics with breathing.  abdominal view with emphasis on TA activation.  439mL, PVR 10mL    PF coordination training with perineal rehabilitative ultrasound   PF and core muscle synergy present   Manual Ther Vaginal exam   Increased mobility of the anterior vaginal wall with increased intra-abdominal pressure       TherAct Education    Urge suppression with use of Kegel Educated on urge suppression with use of Kegel, educated on breathing mechanics with elongation of the pelvic floor. Educated on core and body awareness.    Educated on management of intra-abdominal pressure with return to exercise and strength training. Encouraged core awareness.     Encouraged urge suppression at night, provided education. Encouraged better bladder habits in public and to use protection if needed.        Home Exercises Provided and Patient Education Provided     Education provided:   - anatomy/physiology of pelvic floor, diaphragmatic breathing, fluid intake/dietary modifications, and behavior modifications  Discussed progression of plan of care with patient; educated pt in activity modification; reviewed HEP with pt. Pt demonstrated and verbalized understanding of all instruction and was provided with a handout of HEP (see Patient Instructions).    Written Home Exercises Provided: yes.  Exercises were reviewed and Paty was able to demonstrate them prior to the end of the session.   Paty demonstrated good  understanding of the education provided.     See EMR under Patient Instructions for exercises provided 10/20/2024.    Assessment     Patient tolerated treatment well. Good understanding of physical therapist recommendations. Progressing well with her bladder control.     Paty Is progressing well towards her goals.   Pt prognosis is Excellent.     Pt will continue to benefit from skilled outpatient physical therapy to address the deficits listed in the problem list box on initial evaluation, provide pt/family education and to maximize pt's level of independence in the home and community environment.     Pt's spiritual, cultural and educational needs considered and pt agreeable to plan of care and goals.     Anticipated barriers to physical therapy: none    Short Term Goals: 6 weeks   Patient will be independent with pelvic floor down training/awareness. Goal met 10/21/2024   Patient will be independent with pelvic floor relaxation to improve bowel and bladder evacuation without straining. Goal met 10/21/2024   Patient will be able to demonstrate improved pelvic floor relaxation and contraction on rehabilitative ultrasound vs. sEMG. Goal not met - progressing  Patient will report regular meditation, diaphragmatic breathing, pelvic floor down training. Goal met 10/21/2024 - she is aware   Patient will report being dry 5/7 days a week. Goal met 10/21/2024   Patient will be independent with good water intake and decreased bladder irritants.  Goal met 10/21/2024      Long Term Goals: 12 weeks   Patient will report urinating every 3-4 hours with strong urine stream. Goal not met - progressing - reports frequent urination at night (2xs)   Patient will report full return to regular exercise. Goal not met - progressing  Patient will report improved quality of life and tolerance for activities outside of her home due to improved bladder habits. Goal met 10/21/2024   Patient will demonstrate  adequate pelvic floor coordination with contraction and relaxation on sEMG vs rehabilitative ultrasound. Goal not met - progressing   Patient will be independent with proper core and pelvic floor coordination with progressive strength training. Goal not met - progressing    Plan     Continue with progressive PF mobility and awareness training. Monitor bladder control in public and nocturia.     Sha Good, PT

## 2024-10-21 NOTE — PROGRESS NOTES
"  Pelvic Health Physical Therapy   Treatment Note and Updated POC     Name: Paty Saxena  Clinic Number: [id number]    Therapy Diagnosis:   Encounter Diagnoses   Name Primary?    Overactive bladder Yes    Recurrent UTI     Coordination abnormal     Muscle spasm      Physician: Hazel Vaughn,*    Visit Date: 10/21/2024    Physician Orders: PT Eval and Treat PF PT  Medical Diagnosis from Referral: N32.81 (ICD-10-CM) - Overactive bladder N39.0 (ICD-10-CM) - Recurrent UTI  Evaluation Date: 2024  Authorization Period Expiration: 2025  Plan of Care Expiration: 2024  Progress Note Due: 2024  Visit # 5/ Visits authorized:   FOTO: 3/3    Cancelled Visits: 0  No Show Visits: 0    Time In: 7: 30 AM   Time Out: 8:39 AM     Total Billable Time: 69 minutes    Precautions: Standard    Subjective     Pt reports: Has had a busy 2 weeks. "I think my bladder is doing fine". Gets nervous in public and won't drink due to not wanting to use a port-o-let. Doing well at home. Has some anxiety about traveling. She is picky about what bathrooms she uses. Using her [place] suppositories - no longer burning. Bowels are moving well - doing Magnesium Citrate. On Trospium. Seeing Dr. Vaughn in a few weeks. Would like to start [place]. Holding the urge better.     She was compliant with home exercise program.  Response to previous treatment: good understanding   Functional change: none    Pain in:  0/10  Pain out: 0/10  Location: none     Objective     Paty participated in neuromuscular re-education activities to develop Coordination, Down training, and Proprioception for 30 minutes includinmL, PVR 10mL  PF coordination training with perineal rehabilitative ultrasound, PF and core muscle synergy present    Paty participated in dynamic functional therapeutic activities to improve functional performance for 38 minutes, including: Encouraged urge suppression at night, provided education. " Encouraged better bladder habits in public and to use protection if needed.        Intervention Eval  09/09/2024 09/16/2024  09/23/2024  10/21/2024    Neuro Re-Ed Rehabilitative ultrasound    rehabilitative ultrasound imaging to help visualize pelvic floor muscle contraction and relaxation with kegels  Very little pelvic floor movement with attempts at relaxation.   rehabilitative ultrasound imaging to help visualize pelvic floor muscle contraction and relaxation with kegels  Improved pelvic floor movement with attempts at relaxation. Max cues for mechanics with breathing.  abdominal view with emphasis on TA activation.  439mL, PVR 10mL    PF coordination training with perineal rehabilitative ultrasound   PF and core muscle synergy present   Manual Ther Vaginal exam   Increased mobility of the anterior vaginal wall with increased intra-abdominal pressure       TherAct Education    Urge suppression with use of Kegel Educated on urge suppression with use of Kegel, educated on breathing mechanics with elongation of the pelvic floor. Educated on core and body awareness.    Educated on management of intra-abdominal pressure with return to exercise and strength training. Encouraged core awareness.     Encouraged urge suppression at night, provided education. Encouraged better bladder habits in public and to use protection if needed.        Home Exercises Provided and Patient Education Provided     Education provided:   - anatomy/physiology of pelvic floor, diaphragmatic breathing, fluid intake/dietary modifications, and behavior modifications  Discussed progression of plan of care with patient; educated pt in activity modification; reviewed HEP with pt. Pt demonstrated and verbalized understanding of all instruction and was provided with a handout of HEP (see Patient Instructions).    Written Home Exercises Provided: yes.  Exercises were reviewed and Paty was able to demonstrate them prior to the end of the session.   Paty demonstrated good  understanding of the education provided.     See EMR under Patient Instructions for exercises provided 10/20/2024.    Assessment     Patient tolerated treatment well. Good understanding of physical therapist recommendations. Progressing well with her bladder control.     Paty Is progressing well towards her goals.   Pt prognosis is Excellent.     Pt will continue to benefit from skilled outpatient physical therapy to address the deficits listed in the problem list box on initial evaluation, provide pt/family education and to maximize pt's level of independence in the home and community environment.     Pt's spiritual, cultural and educational needs considered and pt agreeable to plan of care and goals.     Anticipated barriers to physical therapy: none    Short Term Goals: 6 weeks   Patient will be independent with pelvic floor down training/awareness. Goal met 10/21/2024   Patient will be independent with pelvic floor relaxation to improve bowel and bladder evacuation without straining. Goal met 10/21/2024   Patient will be able to demonstrate improved pelvic floor relaxation and contraction on rehabilitative ultrasound vs. sEMG. Goal not met - progressing  Patient will report regular meditation, diaphragmatic breathing, pelvic floor down training. Goal met 10/21/2024 - she is aware   Patient will report being dry 5/7 days a week. Goal met 10/21/2024   Patient will be independent with good water intake and decreased bladder irritants.  Goal met 10/21/2024      Long Term Goals: 12 weeks   Patient will report urinating every 3-4 hours with strong urine stream. Goal not met - progressing - reports frequent urination at night (2xs)   Patient will report full return to regular exercise. Goal not met - progressing  Patient will report improved quality of life and tolerance for activities outside of her home due to improved bladder habits. Goal met 10/21/2024   Patient will demonstrate  adequate pelvic floor coordination with contraction and relaxation on sEMG vs rehabilitative ultrasound. Goal not met - progressing   Patient will be independent with proper core and pelvic floor coordination with progressive strength training. Goal not met - progressing    Plan     Continue with progressive PF mobility and awareness training. Monitor bladder control in public and nocturia.     Sha Good, PT

## 2024-10-21 NOTE — PATIENT INSTRUCTIONS
"- 1 hour before sleep - elevate your feet and press the gas pedal    - get the fluid moving before sleep    - right before sleep go urinate - take your time and relax to empty well - double void if needed     Use the urge suppression techniques  - try not to wake yourself if possible   - Do a Kegel and go back to sleep     Let the bladder hold urine - 3-4 hours max  - this will help with urgency when in public  - do not urinate "just in case"     Practice at home so you do better in public  - pad up and play on in public - temporary   "

## 2024-11-04 ENCOUNTER — CLINICAL SUPPORT (OUTPATIENT)
Dept: REHABILITATION | Facility: HOSPITAL | Age: 69
End: 2024-11-04
Payer: MEDICARE

## 2024-11-04 DIAGNOSIS — R27.8 COORDINATION ABNORMAL: ICD-10-CM

## 2024-11-04 DIAGNOSIS — N32.81 OVERACTIVE BLADDER: Primary | ICD-10-CM

## 2024-11-04 DIAGNOSIS — N39.0 RECURRENT UTI: ICD-10-CM

## 2024-11-04 DIAGNOSIS — M62.838 MUSCLE SPASM: ICD-10-CM

## 2024-11-04 PROCEDURE — 97530 THERAPEUTIC ACTIVITIES: CPT | Mod: PN | Performed by: PHYSICAL THERAPIST

## 2024-11-04 PROCEDURE — 97112 NEUROMUSCULAR REEDUCATION: CPT | Mod: PN | Performed by: PHYSICAL THERAPIST

## 2024-11-04 NOTE — PROGRESS NOTES
Pelvic Health Physical Therapy   Treatment Note      Name: Paty Saxena  Clinic Number: [id number]    Therapy Diagnosis:   Encounter Diagnoses   Name Primary?    Overactive bladder Yes    Recurrent UTI     Muscle spasm     Coordination abnormal      Physician: Hazel Vaughn,*    Visit Date: 11/4/2024    Physician Orders: PT Eval and Treat PF PT  Medical Diagnosis from Referral: N32.81 (ICD-10-CM) - Overactive bladder N39.0 (ICD-10-CM) - Recurrent UTI  Evaluation Date: 7/26/2024  Authorization Period Expiration: 6/25/2025  Plan of Care Expiration: 11/18/2024  Progress Note Due: 11/21/2024  Visit # 6/12 Visits authorized: 5/12  FOTO: 3/3    Cancelled Visits: 0  No Show Visits: 0    Time In: 7:20 AM   Time Out: 8:20 AM     Total Billable Time: 60 minutes    Precautions: Standard    Subjective     Pt reports: States that she is feeling great! Has not had 1 urinary accident. Has not started [place].       She was compliant with home exercise program.  Response to previous treatment: good understanding   Functional change: none    Pain in:  0/10  Pain out: 0/10  Location: none     Objective     Paty participated in neuromuscular re-education activities to develop Coordination, Down training, and Proprioception for 30 minutes including: rehabilitative ultrasound to the TA coordination and awareness. Max cues.     Paty participated in dynamic functional therapeutic activities to improve functional performance for 30 minutes, including: Encouraged progressive core stability and TA awareness. Provided detailed home exercise program.       Intervention 09/23/2024  10/21/2024  11/04/2024    Neuro Re-Ed Rehabilitative ultrasound  abdominal view with emphasis on TA activation.  439mL, PVR 10mL    PF coordination training with perineal rehabilitative ultrasound   PF and core muscle synergy present rehabilitative ultrasound to the TA coordination and awareness. Max cues.    Manual Ther Vaginal exam      TherAct  Education   Educated on management of intra-abdominal pressure with return to exercise and strength training. Encouraged core awareness.     Encouraged urge suppression at night, provided education. Encouraged better bladder habits in public and to use protection if needed.  Encouraged progressive core stability and TA awareness. Provided detailed home exercise program.          Home Exercises Provided and Patient Education Provided     Education provided:   - anatomy/physiology of pelvic floor, diaphragmatic breathing, fluid intake/dietary modifications, and behavior modifications  Discussed progression of plan of care with patient; educated pt in activity modification; reviewed HEP with pt. Pt demonstrated and verbalized understanding of all instruction and was provided with a handout of HEP (see Patient Instructions).    Written Home Exercises Provided: yes.  Exercises were reviewed and Paty was able to demonstrate them prior to the end of the session.  Paty demonstrated good  understanding of the education provided.     See EMR under Patient Instructions for exercises provided 11/3/2024.    Assessment     Patient tolerated treatment well. Good understanding of physical therapist recommendations. Progressing well with her bladder control. Improved TA activation with cues.     Paty Is progressing well towards her goals.   Pt prognosis is Excellent.     Pt will continue to benefit from skilled outpatient physical therapy to address the deficits listed in the problem list box on initial evaluation, provide pt/family education and to maximize pt's level of independence in the home and community environment.     Pt's spiritual, cultural and educational needs considered and pt agreeable to plan of care and goals.     Anticipated barriers to physical therapy: none    Short Term Goals: 6 weeks   Patient will be independent with pelvic floor down training/awareness. Goal met 10/21/2024   Patient will be  independent with pelvic floor relaxation to improve bowel and bladder evacuation without straining. Goal met 10/21/2024   Patient will be able to demonstrate improved pelvic floor relaxation and contraction on rehabilitative ultrasound vs. sEMG. Goal not met - progressing  Patient will report regular meditation, diaphragmatic breathing, pelvic floor down training. Goal met 10/21/2024 - she is aware   Patient will report being dry 5/7 days a week. Goal met 10/21/2024   Patient will be independent with good water intake and decreased bladder irritants.  Goal met 10/21/2024      Long Term Goals: 12 weeks   Patient will report urinating every 3-4 hours with strong urine stream. Goal not met - progressing - reports frequent urination at night (2xs)   Patient will report full return to regular exercise. Goal not met - progressing  Patient will report improved quality of life and tolerance for activities outside of her home due to improved bladder habits. Goal met 10/21/2024   Patient will demonstrate adequate pelvic floor coordination with contraction and relaxation on sEMG vs rehabilitative ultrasound. Goal not met - progressing   Patient will be independent with proper core and pelvic floor coordination with progressive strength training. Goal not met - progressing    Plan     Continue with progressive PF mobility and awareness training. Monitor bladder control in public and nocturia. Monitor progressive TA activation.     Sha Good, PT

## 2024-11-04 NOTE — PATIENT INSTRUCTIONS
Strength is good - go there, don't stay there    If you have increased urinary symptoms - turn it down - breathing and stretching     Otherwise, if you are doing well - continue to progress        With all exercise - good core awareness and stability is KEY!   - if you lift, push pull..and cannot stabilize the core - it is too much!   - plant the right seeds so you can progress       Continue to work on muscle memory and use it with function!   - core weakness =  back pain and over use of the pelvic floor

## 2024-11-18 ENCOUNTER — CLINICAL SUPPORT (OUTPATIENT)
Dept: REHABILITATION | Facility: HOSPITAL | Age: 69
End: 2024-11-18
Payer: MEDICARE

## 2024-11-18 DIAGNOSIS — M62.838 MUSCLE SPASM: ICD-10-CM

## 2024-11-18 DIAGNOSIS — N39.0 RECURRENT UTI: ICD-10-CM

## 2024-11-18 DIAGNOSIS — R27.8 COORDINATION ABNORMAL: ICD-10-CM

## 2024-11-18 DIAGNOSIS — N32.81 OVERACTIVE BLADDER: Primary | ICD-10-CM

## 2024-11-18 PROCEDURE — 97530 THERAPEUTIC ACTIVITIES: CPT | Mod: PN | Performed by: PHYSICAL THERAPIST

## 2024-11-18 NOTE — PROGRESS NOTES
Pelvic Health Physical Therapy   Treatment Note and Discharge Summary     Name: Paty Saxena  Clinic Number: [id number]    Therapy Diagnosis:   Encounter Diagnoses   Name Primary?    Overactive bladder Yes    Recurrent UTI     Muscle spasm     Coordination abnormal      Physician: Hazel Vaughn,*    Visit Date: 11/18/2024    Physician Orders: PT Eval and Treat PF PT  Medical Diagnosis from Referral: N32.81 (ICD-10-CM) - Overactive bladder N39.0 (ICD-10-CM) - Recurrent UTI  Evaluation Date: 7/26/2024  Authorization Period Expiration: 6/25/2025  Plan of Care Expiration: 11/18/2024  Progress Note Due: 11/21/2024  Visit # 7/12 Visits authorized: 6/12  FOTO: 3/3    Cancelled Visits: 0  No Show Visits: 0    Time In: 7:17 AM   Time Out: 7:55 AM   Total Billable Time: 38 minutes    Precautions: Standard    Subjective     Pt reports: Bladder is doing well. She was out of town for the weekend. She did her exercises this week as well.     She was compliant with home exercise program.  Response to previous treatment: good understanding   Functional change: none    Pain in:  0/10  Pain out: 0/10  Location: none     Objective       Paty participated in dynamic functional therapeutic activities to improve functional performance for 38 minutes, including: Encouraged progressive core stability and TA awareness. Provided detailed home exercise program. Encouraged proper TA activation with drawing in of the lower ribs, not generating pressure outward. Educated that if she is dry, she is doing something correctly.       Intervention 09/23/2024  10/21/2024  11/04/2024  11/18/2024    Neuro Re-Ed Rehabilitative ultrasound  abdominal view with emphasis on TA activation.  439mL, PVR 10mL    PF coordination training with perineal rehabilitative ultrasound   PF and core muscle synergy present rehabilitative ultrasound to the TA coordination and awareness. Max cues.     Manual Ther Vaginal exam       TherAct Education    Educated on management of intra-abdominal pressure with return to exercise and strength training. Encouraged core awareness.     Encouraged urge suppression at night, provided education. Encouraged better bladder habits in public and to use protection if needed.  Encouraged progressive core stability and TA awareness. Provided detailed home exercise program.     Encouraged progressive core stability and TA awareness. Provided detailed home exercise program. Encouraged proper TA activation with drawing in of the lower ribs, not generating pressure outward. Educated that if she is dry, she is doing something correctly.        Home Exercises Provided and Patient Education Provided     Education provided:   - anatomy/physiology of pelvic floor, diaphragmatic breathing, fluid intake/dietary modifications, and behavior modifications  Discussed progression of plan of care with patient; educated pt in activity modification; reviewed HEP with pt. Pt demonstrated and verbalized understanding of all instruction and was provided with a handout of HEP (see Patient Instructions).    Written Home Exercises Provided: yes.  Exercises were reviewed and Paty was able to demonstrate them prior to the end of the session.  Paty demonstrated good  understanding of the education provided.     See EMR under Patient Instructions for exercises provided 11/17/2024.    Assessment     Patient tolerated treatment well. Good understanding of physical therapist recommendations. Progressing well with her bladder control. Improved TA activation with cues.  Agreeable to discharge from physical therapy at this time.     Paty Is progressing well towards her goals.   Pt prognosis is Excellent.     Pt will continue to benefit from skilled outpatient physical therapy to address the deficits listed in the problem list box on initial evaluation, provide pt/family education and to maximize pt's level of independence in the home and community  environment.     Pt's spiritual, cultural and educational needs considered and pt agreeable to plan of care and goals.     Anticipated barriers to physical therapy: none    Short Term Goals: 6 weeks   Patient will be independent with pelvic floor down training/awareness. Goal met 10/21/2024   Patient will be independent with pelvic floor relaxation to improve bowel and bladder evacuation without straining. Goal met 10/21/2024   Patient will be able to demonstrate improved pelvic floor relaxation and contraction on rehabilitative ultrasound vs. sEMG. Goal met 11/18/2024    Patient will report regular meditation, diaphragmatic breathing, pelvic floor down training. Goal met 10/21/2024 - she is aware   Patient will report being dry 5/7 days a week. Goal met 10/21/2024   Patient will be independent with good water intake and decreased bladder irritants.  Goal met 10/21/2024      Long Term Goals: 12 weeks   Patient will report urinating every 3-4 hours with strong urine stream. Goal met 11/18/2024    Patient will report full return to regular exercise. Goal met 11/18/2024    Patient will report improved quality of life and tolerance for activities outside of her home due to improved bladder habits. Goal met 10/21/2024   Patient will demonstrate adequate pelvic floor coordination with contraction and relaxation on sEMG vs rehabilitative ultrasound. Goal met 11/18/2024    Patient will be independent with proper core and pelvic floor coordination with progressive strength training. Goal met 11/18/2024      Plan   Discharge physical therapy today. Patient to follow up with me in January.     Sha Good, PT

## 2024-11-18 NOTE — PATIENT INSTRUCTIONS
With core activation  - use your hands to feel what is happening  - when you tighten the core - the lower ribs should pull in  - I do not want you to feel pressure outward or for your chest to rise like you would be holding your breath   - If you are dry with any activity - keep going and don't worry about the core, if you are wet we need to make a change   - If wet - decrease the load, change your mechanics, slow the speed, etc.

## 2025-01-06 RX ORDER — TROSPIUM CHLORIDE 20 MG/1
20 TABLET, FILM COATED ORAL 2 TIMES DAILY
Qty: 180 TABLET | Refills: 0 | Status: SHIPPED | OUTPATIENT
Start: 2025-01-06

## 2025-02-04 ENCOUNTER — OFFICE VISIT (OUTPATIENT)
Dept: UROLOGY | Facility: CLINIC | Age: 70
End: 2025-02-04
Payer: MEDICARE

## 2025-02-04 VITALS
SYSTOLIC BLOOD PRESSURE: 141 MMHG | HEART RATE: 70 BPM | WEIGHT: 132 LBS | HEIGHT: 62 IN | BODY MASS INDEX: 24.29 KG/M2 | DIASTOLIC BLOOD PRESSURE: 77 MMHG

## 2025-02-04 DIAGNOSIS — N20.0 NEPHROLITHIASIS: ICD-10-CM

## 2025-02-04 DIAGNOSIS — R82.71 ASYMPTOMATIC BACTERIURIA: ICD-10-CM

## 2025-02-04 DIAGNOSIS — N32.81 OVERACTIVE BLADDER: ICD-10-CM

## 2025-02-04 DIAGNOSIS — N39.0 RECURRENT UTI: Primary | ICD-10-CM

## 2025-02-04 LAB
BILIRUB UR QL STRIP: NEGATIVE
CLARITY UR REFRACT.AUTO: ABNORMAL
COLOR UR AUTO: YELLOW
GLUCOSE UR QL STRIP: NEGATIVE
HGB UR QL STRIP: ABNORMAL
KETONES UR QL STRIP: NEGATIVE
LEUKOCYTE ESTERASE UR QL STRIP: NEGATIVE
NITRITE UR QL STRIP: NEGATIVE
PH UR STRIP: 6 [PH] (ref 5–8)
PROT UR QL STRIP: NEGATIVE
SP GR UR STRIP: 1.02 (ref 1–1.03)
URN SPEC COLLECT METH UR: ABNORMAL

## 2025-02-04 PROCEDURE — 87186 SC STD MICRODIL/AGAR DIL: CPT | Performed by: UROLOGY

## 2025-02-04 PROCEDURE — 81003 URINALYSIS AUTO W/O SCOPE: CPT | Performed by: UROLOGY

## 2025-02-04 PROCEDURE — 87086 URINE CULTURE/COLONY COUNT: CPT | Performed by: UROLOGY

## 2025-02-04 PROCEDURE — 99214 OFFICE O/P EST MOD 30 MIN: CPT | Mod: S$PBB,,, | Performed by: UROLOGY

## 2025-02-04 PROCEDURE — 87088 URINE BACTERIA CULTURE: CPT | Performed by: UROLOGY

## 2025-02-04 PROCEDURE — 99214 OFFICE O/P EST MOD 30 MIN: CPT | Mod: PBBFAC | Performed by: UROLOGY

## 2025-02-04 PROCEDURE — 99999 PR PBB SHADOW E&M-EST. PATIENT-LVL IV: CPT | Mod: PBBFAC,,, | Performed by: UROLOGY

## 2025-02-04 NOTE — PROGRESS NOTES
"Urology - [facility]  Clinic Note    SUBJECTIVE:     Chief Complaint: recurrent uti    History of Present Illness:  Paty Saxena is a 69 y.o. female who presents to clinic for recurrent UTI. H/o recurrent UTI since 20s. She wants a second opinion. Gets UTI when she travels.   Using vaginal estrogen twice a week. D-mannose 1 g twice a day. Probiotics daily.   Pelvic PT. Had a lot of improvement with urge incontinence.   Trospium twice a day.   Not taking gemtesa.     On HCTZ for prevention of kidney stones, had one 24 hour urine showed hypercalciuria.   H/o kidney stones on imaging. Asymptomatic. 3mm. No recurrent stones.     1/11/2024 e.coli and enterococcus - pan sens. E.coli resist to amp    12/2022 klebsilella 10 - 100,000 rest to amp and macrobid    Ct urogram 2022 - left 3mm stone  Cysto 2022 negative    7/3/2024 cysto  Evidence of cystitis cystica.   Not treated on last culture. Has script for cipro already. She will take it. Gent 80mg IM given today. She is really asymptomatic. Urine was clear.   Continue vaginal estrogen, d-mannose, probiotics. Post coital prophylaxis.   F/u 3 months.   Has a 1.5mm stone on ultrasound. Likely not real. Plan for kub in 1 year.   F/u 3 months.     Anticoagulation:  No    OBJECTIVE:     Estimated body mass index is 24.14 kg/m² as calculated from the following:    Height as of this encounter: 5' 2" (1.575 m).    Weight as of this encounter: 59.9 kg (132 lb).    Vital Signs (Most Recent)  Pulse: 70 (02/04/25 0934)  BP: (!) 141/77 (02/04/25 0934)    Physical Exam  Vitals reviewed.   Pulmonary:      Effort: Pulmonary effort is normal.       No acute distress    Urine dip tr leuks, tr blood     Imaging:    No local records. All outside reports.     Urine dip nitrite positive  ASSESSMENT     1. Recurrent UTI    2. Overactive bladder    3. Nephrolithiasis    4. Asymptomatic bacteriuria        PLAN:   Paty was seen today for follow-up.    Diagnoses and all orders for " this visit:    Recurrent UTI  -     Urinalysis  -     Urine Culture High Risk    Overactive bladder    Nephrolithiasis    Asymptomatic bacteriuria      D- mannose 1 gram twice a day (was only on 400 mg bid)  Continue vaginal estrogen.   Continue trospium 20mg po bid     Kub in summer.       Hazel Vaughn MD

## 2025-02-06 LAB — BACTERIA UR CULT: ABNORMAL

## 2025-02-07 ENCOUNTER — TELEPHONE (OUTPATIENT)
Dept: UROLOGY | Facility: CLINIC | Age: 70
End: 2025-02-07
Payer: MEDICARE

## 2025-02-07 ENCOUNTER — PATIENT MESSAGE (OUTPATIENT)
Dept: UROLOGY | Facility: CLINIC | Age: 70
End: 2025-02-07
Payer: MEDICARE

## 2025-02-07 NOTE — TELEPHONE ENCOUNTER
Message was left on the patient's contact number. She was advised to call the office about her test results.    EDI Hua      ----- Message from Hazel Vaughn MD sent at 2/7/2025 11:01 AM CST -----  Urine did show bacteria, but we won't treat unless she is symptomatic.

## 2025-02-07 NOTE — TELEPHONE ENCOUNTER
Spoke to the patient. She stated that as of right now she is not having any UTI symptoms.    EDI Mathew      ----- Message from Yanet sent at 2/7/2025  4:04 PM CST -----  Regarding: pt advice  Contact: [phone]  .Pt is returning a missed call from someone in the office and is asking for a return call back soon. Thanks.     Reason for call:miss call      Patient's DX:     Patient requesting call back or [facility] msg: [phone]

## 2025-04-11 ENCOUNTER — TELEPHONE (OUTPATIENT)
Dept: UROLOGY | Facility: CLINIC | Age: 70
End: 2025-04-11
Payer: MEDICARE

## 2025-04-11 RX ORDER — TROSPIUM CHLORIDE 20 MG/1
20 TABLET, FILM COATED ORAL 2 TIMES DAILY
Qty: 180 TABLET | Refills: 4 | Status: SHIPPED | OUTPATIENT
Start: 2025-04-11

## 2025-04-11 NOTE — TELEPHONE ENCOUNTER
----- Message from Nurse Willingham sent at 4/10/2025 10:00 AM CDT -----    ----- Message -----  From: Jess Santacruz  Sent: 4/10/2025   9:54 AM CDT  To: [facility] Staff    Type: RX Refill RequestWho Called: Self Have you contacted your pharmacy:yesRefill or New Rx: Refill RX Name and Strength: trospium (SANCTURA) 20 mg Tab tablet     How is the patient currently taking it? (ex. 1XDay): twice a day Is this a 30 day or 90 day RX: 180 qtyPreferred Pharmacy with phone number:[id number] - [place], [place]  [address] or Mail Order:Local Ordering Provider:Dr. Limaould the patient rather a call back or a response via [facility]? Call Best Call Back Number:.[phone] (home)

## 2025-06-19 ENCOUNTER — HOSPITAL ENCOUNTER (OUTPATIENT)
Dept: RADIOLOGY | Facility: HOSPITAL | Age: 70
Discharge: HOME | End: 2025-06-19
Attending: UROLOGY
Payer: MEDICARE

## 2025-06-19 ENCOUNTER — RESULTS FOLLOW-UP (OUTPATIENT)
Dept: UROLOGY | Facility: CLINIC | Age: 70
End: 2025-06-19
Payer: MEDICARE

## 2025-06-19 DIAGNOSIS — N20.0 NEPHROLITHIASIS: ICD-10-CM

## 2025-06-19 PROCEDURE — 74018 RADEX ABDOMEN 1 VIEW: CPT | Mod: TC

## 2025-06-19 PROCEDURE — 74018 RADEX ABDOMEN 1 VIEW: CPT | Mod: 26,,, | Performed by: RADIOLOGY
